# Patient Record
Sex: MALE | Race: BLACK OR AFRICAN AMERICAN | URBAN - METROPOLITAN AREA
[De-identification: names, ages, dates, MRNs, and addresses within clinical notes are randomized per-mention and may not be internally consistent; named-entity substitution may affect disease eponyms.]

---
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
## 2020-06-11 ENCOUNTER — TELEPHONE (OUTPATIENT)
Dept: FAMILY MEDICINE CLINIC | Age: 20
End: 2020-06-11

## 2020-06-11 NOTE — TELEPHONE ENCOUNTER
Patient's mother called wanting to know if  would order him some blood work. She is worried about the amount of weight he has lost. She is hoping that it is due to his new diet but wants to do blood work to make sure everything is okay. Does patient need a virtual visit?

## 2020-06-12 NOTE — TELEPHONE ENCOUNTER
Called pt to schedule a virtual visit. Patient unavailable so vm was left for him to call the office to schedule.

## 2020-07-02 ENCOUNTER — CLINICAL SUPPORT (OUTPATIENT)
Dept: FAMILY MEDICINE CLINIC | Age: 20
End: 2020-07-02

## 2020-07-02 VITALS — TEMPERATURE: 98.2 F

## 2020-07-02 DIAGNOSIS — Z23 ENCOUNTER FOR IMMUNIZATION: ICD-10-CM

## 2020-07-02 NOTE — PATIENT INSTRUCTIONS
Vaccine Information Statement DTaP (Diphtheria, Tetanus, Pertussis) Vaccine: What you need to know Many Vaccine Information Statements are available in [place] and other languages. See www.immunize.org/vis Hojas de información sobre vacunas están disponibles en español y en muchos otros idiomas. Visite www.immunize.org/vis 1. Why get vaccinated? DTaP vaccine can prevent diphtheria, tetanus, and pertussis. Diphtheria and pertussis spread from person to person. Tetanus enters the body through cuts or wounds.  DIPHTHERIA (D) can lead to difficulty breathing, heart failure, paralysis, or death.  TETANUS (T) causes painful stiffening of the muscles. Tetanus can lead to serious health problems, including being unable to open the mouth, having trouble swallowing and breathing, or death.  PERTUSSIS (aP), also known as whooping cough, can cause uncontrollable, violent coughing which makes it hard to breathe, eat, or drink. Pertussis can be extremely serious in babies and young children, causing pneumonia, convulsions, brain damage, or death. In teens and adults, it can cause weight loss, loss of bladder control, passing out, and rib fractures from severe coughing. 2. DTaP vaccine DTaP is only for children younger than 9years old. Different vaccines against tetanus, diphtheria, and pertussis (Tdap and Td) are available for older children, adolescents, and adults. It is recommended that children receive 5 doses of DTaP, usually at the following ages:  2 months  4 months  6 months  1518 months  46 years DTaP may be given as a stand-alone vaccine, or as part of a combination vaccine (a type of vaccine that combines more than one vaccine together into one shot). DTaP may be given at the same time as other vaccines. 3. Talk with your health care provider Tell your vaccine provider if the person getting the vaccine:  Has had an allergic reaction after a previous dose of any vaccine that protects against tetanus, diphtheria, or pertussis, or has any severe, life-threatening allergies.  Has had a coma, decreased level of consciousness, or prolonged seizures within 7 days after a previous dose of any pertussis vaccine (DTP or DTaP).  Has seizures or another nervous system problem.  Has ever had Guillain-Barré Syndrome (also called GBS).  Has had severe pain or swelling after a previous dose of any vaccine that protects against tetanus or diphtheria. In some cases, your childs health care provider may decide to postpone DTaP vaccination to a future visit. Children with minor illnesses, such as a cold, may be vaccinated. Children who are moderately or severely ill should usually wait until they recover before getting DTaP. Your childs health care provider can give you more information. 4. Risks of a vaccine reaction  Soreness or swelling where the shot was given, fever, fussiness, feeling tired, loss of appetite, and vomiting sometimes happen after DTaP vaccination.  More serious reactions, such as seizures, non-stop crying for 3 hours or more, or high fever (over 105°F) after DTaP vaccination happen much less often. Rarely, the vaccine is followed by swelling of the entire arm or leg, especially in older children when they receive their fourth or fifth dose.  Very rarely, long-term seizures, coma, lowered consciousness, or permanent brain damage may happen after DTaP vaccination. As with any medicine, there is a very remote chance of a vaccine causing a severe allergic reaction, other serious injury, or death. 5. What if there is a serious problem? An allergic reaction could occur after the vaccinated person leaves the clinic.  If you see signs of a severe allergic reaction (hives, swelling of the face and throat, difficulty breathing, a fast heartbeat, dizziness, or weakness), call 9-1-1 and get the person to the nearest hospital. 
 
For other signs that concern you, call your health care provider. Adverse reactions should be reported to the Vaccine Adverse Event Reporting System (VAERS). Your health care provider will usually file this report, or you can do it yourself. Visit the VAERS website at www.vaers. hhs.gov or call 9-450.595.6537. VAERS is only for reporting reactions, and VAERS staff do not give medical advice. 6. The National Vaccine Injury Compensation Program 
 
The [facility] Vaccine Injury Compensation Program (VICP) is a federal program that was created to compensate people who may have been injured by certain vaccines. Visit the VICP website at www.[facility].gov/vaccinecompensation or call 1-211.263.9895 to learn about the program and about filing a claim. There is a time limit to file a claim for compensation. 7. How can I learn more?  Ask your health care provider.  Call your local or state health department.  Contact the Centers for Disease Control and Prevention (CDC): 
- Call 3-421.365.8847 (1-800-CDC-INFO) or 
- Visit CDCs website at www.cdc.gov/vaccines Vaccine Information Statement (Interim) DTaP (Diphtheria, Tetanus, Pertussis) Vaccine 04/01/2020 
42 HOLDEN Ruiz [id number] Department of Health and "[employer] Centers for Disease Control and Prevention Office Use Only

## 2020-07-02 NOTE — PROGRESS NOTES
Immunization/s administered 7/2/2020 by Ab Mac LPN with guardian's consent. Patient tolerated procedure well. No reactions noted. TDAP left deltoid, form copied and given to pt. Colorblind test administered to pt, passed.

## 2020-11-18 ENCOUNTER — TELEPHONE (OUTPATIENT)
Dept: FAMILY MEDICINE CLINIC | Age: 20
End: 2020-11-18

## 2020-11-18 NOTE — TELEPHONE ENCOUNTER
Pt is scheduled and he will be getting his labs done after his CPE appointment. Told mother to have him fasting at his appointment.      Future Appointments   Date Time Provider Ari Gunderson   12/21/2020  9:15 AM Rosibel Lee MD [facility]   12/21/2020 10:00 AM [facility]

## 2020-12-21 DIAGNOSIS — Z00.00 ANNUAL PHYSICAL EXAM: Primary | ICD-10-CM

## 2020-12-22 ENCOUNTER — OFFICE VISIT (OUTPATIENT)
Dept: FAMILY MEDICINE CLINIC | Age: 20
End: 2020-12-22
Payer: MEDICAID

## 2020-12-22 ENCOUNTER — APPOINTMENT (OUTPATIENT)
Dept: FAMILY MEDICINE CLINIC | Age: 20
End: 2020-12-22

## 2020-12-22 VITALS
TEMPERATURE: 97.8 F | BODY MASS INDEX: 28.85 KG/M2 | DIASTOLIC BLOOD PRESSURE: 76 MMHG | RESPIRATION RATE: 16 BRPM | SYSTOLIC BLOOD PRESSURE: 142 MMHG | WEIGHT: 232 LBS | HEIGHT: 75 IN | HEART RATE: 73 BPM | OXYGEN SATURATION: 99 %

## 2020-12-22 DIAGNOSIS — Z23 NEEDS FLU SHOT: ICD-10-CM

## 2020-12-22 DIAGNOSIS — R63.4 WEIGHT LOSS: Primary | ICD-10-CM

## 2020-12-22 DIAGNOSIS — R68.89 OTHER GENERAL SYMPTOMS AND SIGNS: ICD-10-CM

## 2020-12-22 PROCEDURE — 99213 OFFICE O/P EST LOW 20 MIN: CPT | Performed by: INTERNAL MEDICINE

## 2020-12-22 PROCEDURE — 90686 IIV4 VACC NO PRSV 0.5 ML IM: CPT | Performed by: INTERNAL MEDICINE

## 2020-12-22 PROCEDURE — 90471 IMMUNIZATION ADMIN: CPT | Performed by: INTERNAL MEDICINE

## 2020-12-22 NOTE — PATIENT INSTRUCTIONS
Complete Blood Count (CBC): About This Test 
What is it? A complete blood count (CBC) is a blood test that gives important information about your blood cells, especially red blood cells, white blood cells, and platelets. Why is this test done? A CBC may be done as part of a regular physical exam. There are many other reasons that a doctor may want this blood test, including to: · Find the cause of symptoms such as fatigue, weakness, fever, bruising, or weight loss. · Check for anemia. · See how much blood has been lost if there is bleeding. · Diagnose polycythemia. · Check for an infection. · Diagnose diseases of the blood, such as leukemia. · Check how the body is dealing with some types of drug or radiation treatment. · Check how abnormal bleeding is affecting the blood cells and counts. · Screen for high and low values before a surgery. · See if there are too many or too few of certain types of cells. This may help find other conditions. For instance, too many eosinophils may be a sign of an allergy or asthma. A blood count can give valuable information about the general state of your health. How do you prepare for the test? 
In general, there's nothing you have to do before this test, unless your doctor tells you to. How is the test done? A health professional uses a needle to take a blood sample, usually from the arm. What happens after the test? 
· You will probably be able to go home right away. · You can go back to your usual activities right away. Follow-up care is a key part of your treatment and safety. Be sure to make and go to all appointments, and call your doctor if you are having problems. It's also a good idea to keep a list of the medicines you take. Ask your doctor when you can expect to have your test results. Where can you learn more? Go to http://www.[employer].com/ Enter R107 in the search box to learn more about \"Complete Blood Count (CBC): About This Test.\" Current as of: December 9, 2019               Content Version: 12.6 © 0021-8895 [employer]. Care instructions adapted under license by [employer] (which disclaims liability or warranty for this information). If you have questions about a medical condition or this instruction, always ask your healthcare professional. [place] [address] any warranty or liability for your use of this information.

## 2020-12-22 NOTE — PROGRESS NOTES
Deni Mora is a 21 y.o. male (: 2000) presenting to address:    Chief Complaint   Patient presents with    Complete Physical     fasting        Vitals:    20 0738   BP: (!) 142/76   Pulse: 73   Resp: 16   Temp: 97.8 °F (36.6 °C)   TempSrc: Temporal   SpO2: 99%   Weight: 232 lb (105.2 kg)   Height: 6' 3\" (1.905 m)   PainSc:   0 - No pain       Hearing/Vision:      Hearing Screening    125Hz 250Hz 500Hz 1000Hz 2000Hz 3000Hz 4000Hz 6000Hz 8000Hz   Right ear:            Left ear:               Visual Acuity Screening    Right eye Left eye Both eyes   Without correction: 20/20 20/20 20/20   With correction:          Learning Assessment:     Learning Assessment 2018   PRIMARY LEARNER Patient   HIGHEST LEVEL OF EDUCATION - PRIMARY LEARNER  DID NOT GRADUATE HIGH SCHOOL   BARRIERS PRIMARY LEARNER NONE   PRIMARY LANGUAGE ENGLISH   LEARNER PREFERENCE PRIMARY READING   ANSWERED BY patient   RELATIONSHIP SELF     Depression Screening:     3 most recent PHQ Screens 2020   Little interest or pleasure in doing things Not at all   Feeling down, depressed, irritable, or hopeless Not at all   Total Score PHQ 2 0     Fall Risk Assessment:     Fall Risk Assessment, last 12 mths 2020   Able to walk? Yes   Fall in past 12 months? No     Abuse Screening:   No flowsheet data found. Coordination of Care Questionaire:   1. Have you been to the ER, urgent care clinic since your last visit? Hospitalized since your last visit? NO    2. Have you seen or consulted any other health care providers outside of the [address] [place] [address] since your last visit? Include any pap smears or colon screening. NO    Advanced Directive:   1. Do you have an Advanced Directive? NO    2. Would you like information on Advanced Directives? NO    Flu shot Immunization/s administered 2020 by Ilana Marcial LPN with guardian's consent. Patient tolerated procedure well. No reactions noted.

## 2020-12-22 NOTE — PROGRESS NOTES
Assessment/Plan:    *Diagnoses and all orders for this visit:    1. Weight loss  -     CBC WITH AUTOMATED DIFF; Future  -     HEPATIC FUNCTION PANEL; Future  -     LIPID PANEL; Future  -     METABOLIC PANEL, BASIC; Future  -     TSH 3RD GENERATION; Future  -     T4, FREE; Future        The plan was discussed with the patient. The patient verbalized understanding and is in agreement with the plan. All medication potential side effects were discussed with the patient.    -------------------------------------------------------------------------------------------------------------------        Ronnie Bingham is a 21 y.o. male and presents with Complete Physical (fasting )         Subjective:  Pt here for f/u and wellness visit. He has been in good general health. 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 Since Covid, has been home and running everyday. He also stopped eating meat in Jan 2020 so he attributes his wt loss to these two things. He feels great and he no longer has knee pain with the wt loss. No other complaints. Review of Systems - General ROS: negative         The problem list was updated as a part of today's visit. There is no problem list on file for this patient. The PSH, FH were reviewed. SH:  Social History     Tobacco Use    Smoking status: Never Smoker    Smokeless tobacco: Never Used   Substance Use Topics    Alcohol use: No     Frequency: Never    Drug use: No       Medications/Allergies:  No current outpatient medications on file prior to visit. No current facility-administered medications on file prior to visit.          No Known Allergies      Health Maintenance:   Health Maintenance   Topic Date Due    Flu Vaccine (1) 09/01/2020    DTaP/Tdap/Td series (4 - Td) 07/02/2030    HPV Age 9Y-34Y  Completed    Hepatitis A Peds Age 1-18  Completed    Pneumococcal 0-64 years  Aged Out       Objective:  Visit Vitals  BP (!) 142/76   Pulse 73   Temp 97.8 °F (36.6 °C) (Temporal)   Resp 16   Ht 6' 3\" (1.905 m)   Wt 232 lb (105.2 kg)   SpO2 99%   BMI 29.00 kg/m²          Nurses notes and VS reviewed. Physical Examination: General appearance - alert, well appearing, and in no distress  Ears - bilateral TM's and external ear canals normal  Neck - supple, no significant adenopathy  Chest - clear to auscultation, no wheezes, rales or rhonchi, symmetric air entry  Heart - normal rate, regular rhythm, normal S1, S2, no murmurs, rubs, clicks or gallops  Abdomen - soft, nontender, nondistended, no masses or organomegaly  Musculoskeletal - no joint tenderness, deformity or swelling  Extremities - peripheral pulses normal, no pedal edema, no clubbing or cyanosis      Labwork and Ancillary Studies:    CBC w/Diff  Lab Results   Component Value Date/Time    WBC 5.5 12/18/2019 01:28 PM    HGB 15.0 12/18/2019 01:28 PM    PLATELET 059 83/63/3960 01:28 PM         Basic Metabolic Profile  Lab Results   Component Value Date/Time    Sodium 144 12/18/2019 01:28 PM    Potassium 4.1 12/18/2019 01:28 PM    Chloride 104 12/18/2019 01:28 PM    CO2 23 12/18/2019 01:28 PM    Anion gap 17.0 12/18/2019 01:28 PM    Glucose 94 12/18/2019 01:28 PM    BUN 10 12/18/2019 01:28 PM    Creatinine 1.1 12/18/2019 01:28 PM    Calcium 9.6 12/18/2019 01:28 PM         LFT  Lab Results   Component Value Date/Time    ALT (SGPT) 19 12/18/2019 01:28 PM    Alk.  phosphatase 84 12/18/2019 01:28 PM    Bilirubin, direct <0.2 12/18/2019 01:28 PM    Bilirubin, total 0.5 12/18/2019 01:28 PM         Cholesterol  Lab Results   Component Value Date/Time    Cholesterol, total 160 12/18/2019 01:28 PM    HDL Cholesterol 35 (L) 12/18/2019 01:28 PM    LDL, calculated 117 (H) 12/18/2019 01:28 PM    Triglyceride 42 12/18/2019 01:28 PM

## 2020-12-29 ENCOUNTER — TELEPHONE (OUTPATIENT)
Dept: FAMILY MEDICINE CLINIC | Age: 20
End: 2020-12-29